# Patient Record
Sex: MALE | Race: WHITE | NOT HISPANIC OR LATINO | URBAN - METROPOLITAN AREA
[De-identification: names, ages, dates, MRNs, and addresses within clinical notes are randomized per-mention and may not be internally consistent; named-entity substitution may affect disease eponyms.]

---

## 2023-03-03 ENCOUNTER — EMERGENCY (EMERGENCY)
Facility: HOSPITAL | Age: 43
LOS: 1 days | Discharge: HOME | End: 2023-03-03
Admitting: STUDENT IN AN ORGANIZED HEALTH CARE EDUCATION/TRAINING PROGRAM
Payer: COMMERCIAL

## 2023-03-03 VITALS
HEART RATE: 60 BPM | RESPIRATION RATE: 16 BRPM | DIASTOLIC BLOOD PRESSURE: 75 MMHG | SYSTOLIC BLOOD PRESSURE: 115 MMHG | OXYGEN SATURATION: 98 % | TEMPERATURE: 98 F

## 2023-03-03 VITALS
TEMPERATURE: 98 F | DIASTOLIC BLOOD PRESSURE: 90 MMHG | SYSTOLIC BLOOD PRESSURE: 134 MMHG | WEIGHT: 158.07 LBS | HEIGHT: 66 IN | OXYGEN SATURATION: 98 % | HEART RATE: 74 BPM | RESPIRATION RATE: 16 BRPM

## 2023-03-03 DIAGNOSIS — Z87.891 PERSONAL HISTORY OF NICOTINE DEPENDENCE: ICD-10-CM
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
## 2023-03-03 DIAGNOSIS — S39.012A STRAIN OF MUSCLE, FASCIA AND TENDON OF LOWER BACK, INITIAL ENCOUNTER: ICD-10-CM
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
## 2023-03-03 DIAGNOSIS — Z86.79 PERSONAL HISTORY OF OTHER DISEASES OF THE CIRCULATORY SYSTEM: ICD-10-CM

## 2023-03-03 DIAGNOSIS — M54.50 LOW BACK PAIN, UNSPECIFIED: ICD-10-CM

## 2023-03-03 PROCEDURE — 99283 EMERGENCY DEPT VISIT LOW MDM: CPT | Mod: 25

## 2023-03-03 PROCEDURE — 99284 EMERGENCY DEPT VISIT MOD MDM: CPT

## 2023-03-03 PROCEDURE — 72100 X-RAY EXAM L-S SPINE 2/3 VWS: CPT

## 2023-03-03 PROCEDURE — 96372 THER/PROPH/DIAG INJ SC/IM: CPT

## 2023-03-03 PROCEDURE — 72100 X-RAY EXAM L-S SPINE 2/3 VWS: CPT | Mod: 26

## 2023-03-03 RX ORDER — IBUPROFEN 200 MG
1 TABLET ORAL
Qty: 12 | Refills: 0
Start: 2023-03-03

## 2023-03-03 RX ORDER — CYCLOBENZAPRINE HYDROCHLORIDE 10 MG/1
10 TABLET, FILM COATED ORAL ONCE
Refills: 0 | Status: COMPLETED | OUTPATIENT
Start: 2023-03-03 | End: 2023-03-03

## 2023-03-03 RX ORDER — CYCLOBENZAPRINE HYDROCHLORIDE 10 MG/1
1 TABLET, FILM COATED ORAL
Qty: 9 | Refills: 0
Start: 2023-03-03

## 2023-03-03 RX ORDER — KETOROLAC TROMETHAMINE 30 MG/ML
15 SYRINGE (ML) INJECTION ONCE
Refills: 0 | Status: DISCONTINUED | OUTPATIENT
Start: 2023-03-03 | End: 2023-03-03

## 2023-03-03 RX ORDER — LIDOCAINE 4 G/100G
1 CREAM TOPICAL ONCE
Refills: 0 | Status: COMPLETED | OUTPATIENT
Start: 2023-03-03 | End: 2023-03-03

## 2023-03-03 RX ADMIN — Medication 15 MILLIGRAM(S): at 12:10

## 2023-03-03 RX ADMIN — LIDOCAINE 1 PATCH: 4 CREAM TOPICAL at 12:11

## 2023-03-03 RX ADMIN — CYCLOBENZAPRINE HYDROCHLORIDE 10 MILLIGRAM(S): 10 TABLET, FILM COATED ORAL at 12:10

## 2023-03-03 NOTE — ED PROVIDER NOTE - CLINICAL SUMMARY MEDICAL DECISION MAKING FREE TEXT BOX
Patient with acute onset low back pain that developed as he lifted a heavy object.  Has significant pain, but no neurologic deficits.  No bladder/bowel dysfunction.  Do not suspect spinal cord compression or cauda equina syndrome.   No fever to suggest an infectious process such as epidural abscess or pyelonephritis.    Will obtain plain films and treat pain, reassess.

## 2023-03-03 NOTE — ED PROVIDER NOTE - CARE PROVIDER_API CALL
Darrell Cuba)  Orthopedics  [address] [address]  Phone: [phone]  Fax: [phone]  Follow Up Time:

## 2023-03-03 NOTE — ED PROVIDER NOTE - OBJECTIVE STATEMENT
42-year-old male with history of left bundle branch block was at work this morning when he lifted a heavy bucket of water from the floor; as he lifted it he felt a sudden sharp pain in the midline lower back and felt a "pop".  He did not fall to the floor.  He initially had some  tingling in the legs, but that has resolved.  He only has severe pain in the midline low back now.  Has not taken anything for the pain.  EMS was called and transported him to the hospital.  He has had no history of back problems.  He does not take any prescription medications.  He has no history of malignancy, steroid use, IVDU.  There has been no fever or chills, no saddle anesthesia, no weakness or numbness in the legs, no loss of bladder or bowel control.  He denies any chest pain, palpitations, or shortness of breath.  He denies any abdominal pain, history of GI bleeding or peptic ulcer disease, or melena.  No dysuria or hematuria. 42-year-old male with history of left bundle branch block was at work this morning when he lifted a heavy bucket of water from the floor; as he lifted it he felt a sudden sharp pain in the midline lower back and felt a "pop".  He did not fall to the floor.  He initially had some  tingling in the legs, but that has resolved.  He only has severe pain in the midline low back now.  Has not taken anything for the pain.  EMS was called and transported him to the hospital. Unable to stand or walk due to severe back pain. He has had no history of back problems.  He does not take any prescription medications.  He has no history of malignancy, steroid use, IVDU.  There has been no fever or chills, no saddle anesthesia, no weakness or numbness in the legs, no loss of bladder or bowel control.  He denies any chest pain, palpitations, or shortness of breath.  He denies any abdominal pain, history of GI bleeding or peptic ulcer disease, or melena.  No dysuria or hematuria.

## 2023-03-03 NOTE — ED PROVIDER NOTE - CARE PROVIDERS DIRECT ADDRESSES
[email or web address]

## 2023-03-03 NOTE — ED PROVIDER NOTE - NSFOLLOWUPINSTRUCTIONS_ED_ALL_ED_FT
Follow up with your primary care provider after the weekend.  If your pain persists, you may need to follow up with a spine specialist as well.      Return to the Emergency Department if you have any new or worsening symptoms, or if you have any concerns.  =====================    Lumbar Strain      A lumbar strain, which is sometimes called a low-back strain, is a stretch or tear in a muscle or the strong cords of tissue that attach muscle to bone (tendons) in the lower back (lumbar spine). This type of injury occurs when muscles or tendons are torn or are stretched beyond their limits.    Lumbar strains can range from mild to severe. Mild strains may involve stretching a muscle or tendon without tearing it. These may heal in 1–2 weeks. More severe strains involve tearing of muscle fibers or tendons. These will cause more pain and may take 6–8 weeks to heal.      What are the causes?    This condition may be caused by:  •Trauma, such as a fall or a hit to the body.      •Twisting or overstretching the back. This may result from doing activities that need a lot of energy, such as lifting heavy objects.        What increases the risk?    This injury is more common in:  •Athletes.      •People with obesity.      •People who do repeated lifting, bending, or other movements that involve their back.        What are the signs or symptoms?    Symptoms of this condition may include:  •Sharp or dull pain in the lower back that does not go away. The pain may extend to the buttocks.      •Stiffness or limited range of motion.      •Sudden muscle tightening (spasms).        How is this diagnosed?    This condition may be diagnosed based on:  •Your symptoms.      •Your medical history.      •A physical exam.    •Imaging tests, such as:  •X-rays.      •MRI.          How is this treated?    Treatment for this condition may include:  •Rest.      •Applying heat and cold to the affected area.      •Over-the-counter medicines to help relieve pain and inflammation, such as NSAIDs.      •Prescription pain medicine and muscle relaxants may be needed for a short time.      •Physical therapy.        Follow these instructions at home:      Managing pain, stiffness, and swelling       Bag of ice on a towel on the skin.       A heating pad for use on the affected area.   •If directed, put ice on the injured area during the first 24 hours after your injury.  •Put ice in a plastic bag.      •Place a towel between your skin and the bag.      •Leave the ice on for 20 minutes, 2–3 times a day.      •If directed, apply heat to the affected area as often as told by your health care provider. Use the heat source that your health care provider recommends, such as a moist heat pack or a heating pad.  •Place a towel between your skin and the heat source.      •Leave the heat on for 20–30 minutes.      •Remove the heat if your skin turns bright red. This is especially important if you are unable to feel pain, heat, or cold. You may have a greater risk of getting burned.        Activity     •Rest and return to your normal activities as told by your health care provider. Ask your health care provider what activities are safe for you.      •Do exercises as told by your health care provider.      Medicines     •Take over-the-counter and prescription medicines only as told by your health care provider.    •Ask your health care provider if the medicine prescribed to you:  •Requires you to avoid driving or using heavy machinery.    •Can cause constipation. You may need to take these actions to prevent or treat constipation:  •Drink enough fluid to keep your urine pale yellow.      •Take over-the-counter or prescription medicines.      •Eat foods that are high in fiber, such as beans, whole grains, and fresh fruits and vegetables.      •Limit foods that are high in fat and processed sugars, such as fried or sweet foods.            Injury prevention   A person showing the correct and incorrect way to lift a heavy object. The correct way shows the person's knees bent.   To prevent a future low-back injury:  •Always warm up properly before physical activity or sports.      •Cool down and stretch after being active.      •Use correct form when playing sports and lifting heavy objects. Bend your knees before you lift heavy objects.      •Use good posture when sitting and standing.    •Stay physically fit and keep a healthy weight.  •Do at least 150 minutes of moderate-intensity exercise each week, such as brisk walking or water aerobics.      •Do strength exercises at least 2 times each week.        General instructions     • Do not use any products that contain nicotine or tobacco, such as cigarettes, e-cigarettes, and chewing tobacco. If you need help quitting, ask your health care provider.      •Keep all follow-up visits as told by your health care provider. This is important.        Contact a health care provider if:    •Your back pain does not improve after 6 weeks of treatment.      •Your symptoms get worse.        Get help right away if:    •Your back pain is severe.      •You are unable to stand or walk.      •You develop pain in your legs.      •You develop weakness in your buttocks or legs.    •You have difficulty controlling when you urinate or when you have a bowel movement.  •You have frequent, painful, or bloody urination.      •You have a temperature over 101.0°F (38.3°C)          Summary    •A lumbar strain, which is sometimes called a low-back strain, is a stretch or tear in a muscle or the strong cords of tissue that attach muscle to bone (tendons) in the lower back (lumbar spine).      •This type of injury occurs when muscles or tendons are torn or are stretched beyond their limits.      •Rest and return to your normal activities as told by your health care provider. If directed, apply heat and ice to the affected area as often as told by your health care provider.      •Take over-the-counter and prescription medicines only as told by your health care provider.      •Contact a health care provider if you have new or worsening symptoms.      This information is not intended to replace advice given to you by your health care provider. Make sure you discuss any questions you have with your health care provider.

## 2023-03-03 NOTE — ED PROVIDER NOTE - ATTENDING APP SHARED VISIT CONTRIBUTION OF CARE
Although I did not see this patient or participate in his care, I have reviewed this chart and have found no issues with the documented care.

## 2023-03-03 NOTE — ED PROVIDER NOTE - PATIENT PORTAL LINK FT
You can access the FollowMyHealth Patient Portal offered by [facility] by registering at the following website: [email or web address]. By joining [facility]’s FollowMyHealth portal, you will also be able to view your health information using other applications (apps) compatible with our system.

## 2023-03-03 NOTE — ED ADULT NURSE NOTE - OBJECTIVE STATEMENT
Pt presents to ED c/o lower back pain S/P "lifting a bucket of water at work". Pt states, " I felt a pop and now I'm having a lot of pain". Pt able to lift right leg on stretcher but is having trouble lifting left leg d/t pain in back. Pt states "numbness in my leg comes and goes in waves and it feels electrical." Painful ambulating reported by EMS. Denies bowel/ bladder incontinence at this time. 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 Pt A&Ox4, speaking in clear/full sentences, V/S stable.

## 2023-03-03 NOTE — ED PROVIDER NOTE - PHYSICAL EXAMINATION
CONSTITUTIONAL: NAD   SKIN: Normal color and turgor.    HEAD: NC/AT.  EYES: Conjunctiva clear. EOMI. PERRL.    ENT: Airway clear. Normal voice.   RESPIRATORY:  Normal work of breathing. Lungs CTAB.  CARDIOVASCULAR:  RRR, S1S2. No M/R/G.      GI:  Abdomen soft, nontender.    MSK: Neck supple.  No tenderness/deformity/swelling over spine.  SLR negative.  No LE edema or calf tenderness. No joint swelling or ROM limitation.  Pedal pulses strong. Feet WWP.  NEURO: Alert; CN: grossly intact. Speech clear. SILT throughout lower ext bilat.  Motor strength 5/5 in all MMG bilat lower ext.  Patellar DTR +2 bilat. CONSTITUTIONAL: NAD   SKIN: Normal color and turgor.    HEAD: NC/AT.  EYES: Conjunctiva clear. EOMI. PERRL.    ENT: Airway clear. Normal voice.   RESPIRATORY:  Normal work of breathing. Lungs CTAB.  CARDIOVASCULAR:  RRR, S1S2. No M/R/G.      GI:  Abdomen soft, nontender.    MSK: Neck supple.  Severe pain with even small movements of torso.  No tenderness/deformity/swelling over spine.  SLR negative.  No LE edema or calf tenderness. No joint swelling or ROM limitation.  Pedal pulses strong. Feet WWP.  NEURO: Alert; CN: grossly intact. Speech clear. SILT throughout lower ext bilat.  Motor strength 5/5 in all MMG bilat lower ext.  Patellar DTR +2 bilat.

## 2023-03-03 NOTE — ED ADULT TRIAGE NOTE - CHIEF COMPLAINT QUOTE
Pt presents to ED C/O lower back pain S/P " lifting a bucket of water at work". Pt states, " I felt a pop and now I'm having 10/10 pain". Pt able to lift legs on stretcher, painful ambulating reported by EMS. Denies bowel/ bladder incontinence at this time. 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

## 2023-03-03 NOTE — ED ADULT NURSE NOTE - NSIMPLEMENTINTERV_GEN_ALL_ED
Implemented All Fall Risk Interventions:  [place] to call system. Call bell, personal items and telephone within reach. Instruct patient to call for assistance. Room bathroom lighting operational. Non-slip footwear when patient is off stretcher. Physically safe environment: no spills, clutter or unnecessary equipment. Stretcher in lowest position, wheels locked, appropriate side rails in place. Provide visual cue, wrist band, yellow gown, etc. Monitor gait and stability. Monitor for mental status changes and reorient to person, place, and time. Review medications for side effects contributing to fall risk. Reinforce activity limits and safety measures with patient and family.

## 2023-03-03 NOTE — ED PROVIDER NOTE - NS ED ATTENDING STATEMENT MOD
This was a shared visit with the TIMOTEO. I reviewed and verified the documentation and independently performed the documented: